# Patient Record
Sex: FEMALE | Race: BLACK OR AFRICAN AMERICAN | URBAN - METROPOLITAN AREA
[De-identification: names, ages, dates, MRNs, and addresses within clinical notes are randomized per-mention and may not be internally consistent; named-entity substitution may affect disease eponyms.]

---

## 2017-02-17 ENCOUNTER — OFFICE VISIT (OUTPATIENT)
Dept: NEUROLOGY | Age: 47
End: 2017-02-17

## 2017-02-17 VITALS
OXYGEN SATURATION: 96 % | DIASTOLIC BLOOD PRESSURE: 90 MMHG | HEIGHT: 67 IN | WEIGHT: 234 LBS | HEART RATE: 67 BPM | SYSTOLIC BLOOD PRESSURE: 132 MMHG | RESPIRATION RATE: 16 BRPM | BODY MASS INDEX: 36.73 KG/M2

## 2017-02-17 DIAGNOSIS — G56.02 MILD CARPAL TUNNEL SYNDROME OF LEFT WRIST: ICD-10-CM

## 2017-02-17 DIAGNOSIS — G43.709 CHRONIC MIGRAINE W/O AURA W/O STATUS MIGRAINOSUS, NOT INTRACTABLE: ICD-10-CM

## 2017-02-17 DIAGNOSIS — M54.16 LUMBAR BACK PAIN WITH RADICULOPATHY AFFECTING LEFT LOWER EXTREMITY: Primary | ICD-10-CM

## 2017-02-17 DIAGNOSIS — M47.22 CERVICAL RADICULOPATHY DUE TO DEGENERATIVE JOINT DISEASE OF SPINE: ICD-10-CM

## 2017-02-17 DIAGNOSIS — M54.16 LUMBAR BACK PAIN WITH RADICULOPATHY AFFECTING RIGHT LOWER EXTREMITY: ICD-10-CM

## 2017-02-17 RX ORDER — GABAPENTIN 300 MG/1
300 CAPSULE ORAL 3 TIMES DAILY
Qty: 100 CAP | Refills: 11 | Status: SHIPPED | OUTPATIENT
Start: 2017-02-17

## 2017-02-17 RX ORDER — NORTRIPTYLINE HYDROCHLORIDE 25 MG/1
CAPSULE ORAL
COMMUNITY

## 2017-02-17 RX ORDER — MELOXICAM 15 MG/1
15 TABLET ORAL DAILY
COMMUNITY
End: 2017-02-17 | Stop reason: SDUPTHER

## 2017-02-17 RX ORDER — GABAPENTIN 300 MG/1
300 CAPSULE ORAL
COMMUNITY
End: 2017-02-17 | Stop reason: SDUPTHER

## 2017-02-17 RX ORDER — CETIRIZINE HCL 10 MG
TABLET ORAL
COMMUNITY

## 2017-02-17 RX ORDER — MELOXICAM 15 MG/1
15 TABLET ORAL DAILY
Qty: 30 TAB | Refills: 11 | Status: SHIPPED | OUTPATIENT
Start: 2017-02-17

## 2017-02-17 RX ORDER — METOPROLOL TARTRATE 25 MG/1
TABLET, FILM COATED ORAL 2 TIMES DAILY
COMMUNITY

## 2017-02-17 RX ORDER — BUPROPION HYDROCHLORIDE 150 MG/1
TABLET, EXTENDED RELEASE ORAL 2 TIMES DAILY
COMMUNITY
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
## 2017-02-17 NOTE — PROCEDURES
[facility] Neurology Clinic at [address] [facility] [address] [facility], [address] [facility]  Tel [phone]     Fax [phone]  Electrodiagnostic Study Report  Test Date:  2017    Patient: Radha Beck : 1970 Physician: Leann Peterson MD   Sex: Female  < Ref Phys: Randa Leoker     Clinical Indication: Patient is a 81-year-old female with a history of severe back pain radiating to both legs, and neck pain radiating to left arm, with a history of multiple trauma to rule out motor radiculopathy, lumbar stenosis, or other entrapment neuropathies or polyneuropathies. Impression: This study shows electrophysiologic evidence of an essentially normal EMG and nerve conduction velocity study of both legs, showing no clear evidence of motor radiculopathy or other neuromuscular disease, and a borderline minimal carpal tunnel syndrome in the left upper extremity as manifested by mild prolongation of the distal sensory latency of the median nerve only. Electro study was normal and the left upper extremity and the distal motor latency of the median nerve was also normal.    Correlation with imaging modalities may be of further diagnostic benefit if clinically indicated, and repeat studies in 6-12 months time may also be of further diagnostic benefit if clinically indicated. EMG & NCV Findings:  Evaluation of the left median sensory nerve showed prolonged distal peak latency (4.3 ms) and decreased conduction velocity (Wrist-2nd Digit, 33 m/s). The right sural sensory nerve showed prolonged distal peak latency (4.1 ms) and decreased conduction velocity (Calf-Lat Mall, 34 m/s). All remaining nerves (as indicated in the following tables) were within normal limits. Left vs. Right side comparison data for the tibial motor nerve indicates abnormal L-R velocity difference (Knee-Ankle, 11 m/s). All remaining left vs. right side differences were within normal limits. Nerve Conduction Studies  Anti Sensory Summary Table     Stim Site NR Peak (ms) P-T Amp (µV) Site1 Site2 Delta-P (ms) Dist (cm) Rene (m/s)   Left Median Anti Sensory (2nd Digit)  24.9°C   Wrist    4.3 37.1 Wrist 2nd Digit 4.3 14.0 33   Left Radial Anti Sensory (Base 1st Digit)  25°C   Wrist    2.6 34.7 Wrist Base 1st Digit 2.6 0.0    Right Sup Fibular Anti Sensory (Ant Lat Mall)  25°C   14 cm    3.8 18.2 14 cm Ant Lat Mall 3.8 14.0 37   Site 2    3.7 24.1        Left Sural Anti Sensory (Lat Mall)  25°C   Calf    4.0 8.7 Calf Lat Mall 4.0 14.0 35   Site 2    3.8 19.4        Right Sural Anti Sensory (Lat Mall)  24.9°C   Calf    4.1 17.4 Calf Lat Mall 4.1 14.0 34   Site 2    4.4 12.7        Left Ulnar Anti Sensory (5th Digit)  24.9°C   Wrist    3.6 39.3 Wrist 5th Digit 3.6 14.0 39     Motor Summary Table     Stim Site NR Onset (ms) O-P Amp (mV) Site1 Site2 Delta-0 (ms) Dist (cm) Rene (m/s)   Left Fibular Motor (Ext Dig Brev)  24.9°C   Ankle    3.4 4.6 B Fib Ankle 7.1 37.0 52   B Fib    10.5 4.4 Poplt B Fib 2.1 11.0 52   Poplt    12.6 4.4        Right Fibular Motor (Ext Dig Brev)  24.9°C   Ankle    3.7 6.8 B Fib Ankle 7.6 38.0 50   B Fib    11.3 5.9 Poplt B Fib 1.9 12.0 63   Poplt    13.2 5.9        Left Median Motor (Abd Poll Brev)  24.9°C   Wrist    3.4 13.0 Elbow Wrist 3.9 26.0 79T   Elbow    7.3 10.3        Left Tibial Motor (Abd  Brev)  25°C   Ankle    5.2 8.7 Knee Ankle 7.0 37.0 53   Knee    12.2 7.2        Right Tibial Motor (Abd  Brev)  24.9°C   Ankle    4.8 11.7 Knee Ankle 8.4 35.0 42   Knee    13.2 9.0        Left Ulnar Motor (Abd Dig Minimi)  24.9°C   Wrist    2.1 8.3 B Elbow Wrist 2.0 23.0 115   B Elbow    4.1 7.5 A Elbow B Elbow 1.9 11.0 58   A Elbow    6.0 7.6          Comparison Summary Table     Stim Site NR Peak (ms) P-T Amp (µV) Site1 Site2 Delta-P (ms)   Left Median/Ulnar Palm Comparison (Wrist - 8cm)  25°C   Median Palm    1.9 13.4 Median Palm Ulnar Palm 0.0   Ulnar Palm    1.9 5.0 EMG     Side Muscle Nerve Root Ins Act Fibs Psw Amp Dur Poly Recrt Comment   Right AntTibialis Dp Br Fibular L4-5 Nml Nml Nml Nml Nml 0 Nml    Right Gastroc Tibial S1-2 Nml Nml Nml Nml Nml 0 Nml    Right VastusLat Femoral L2-4 Nml Nml Nml Nml Nml 0 Nml    Right BicepsFemS Sciatic L5-S1 Nml Nml Nml Nml Nml 0 Nml    Right Fibularis Long Sup Br Fibular L5-S1 Nml Nml Nml Nml Nml 0 Nml    Right Lumbo Parasp Low Rami L5-S1 Nml Nml Nml        Left Abd Poll Brev Median C8-T1 Nml Nml Nml Nml Nml 0 Nml    Left 1stDorInt Ulnar C8-T1 Nml Nml Nml Nml Nml 0 Nml    Left Biceps Musculocut C5-6 Nml Nml Nml Nml Nml 0 Nml    Left Triceps Radial C6-7-8 Nml Nml Nml Nml Nml 0 Nml    Left Deltoid Axillary C5-6 Nml Nml Nml Nml Nml 0 Nml    Left Cervical Parasp Mid Rami C4-6 Nml Nml Nml        Left AntTibialis Dp Br Fibular L4-5 Nml Nml Nml Nml Nml 0 Nml    Left Gastroc Tibial S1-2 Nml Nml Nml Nml Nml 0 Nml    Left VastusLat Femoral L2-4 Nml Nml Nml Nml Nml 0 Nml    Left BicepsFemS Sciatic L5-S1 Nml Nml Nml Nml Nml 0 Nml    Left Fibularis Long Sup Br Fibular L5-S1 Nml Nml Nml Nml Nml 0 Nml    Left Lumbo Parasp Low Rami L5-S1 Nml Nml Nml            Waveforms:                                       __________________  Eze Neves M.D.

## 2017-02-17 NOTE — MR AVS SNAPSHOT
Visit Information Date & Time Provider Department Dept. Phone Encounter #  
 2/17/2017 11:00 AM Sohan Hollins MD Neurology Clinic at [facility] [phone] [id number] Follow-up Instructions Return in about 6 months (around 8/17/2017). Allergies as of 2/17/2017  Review Complete On: 2/17/2017 By: Sohan Hollins MD  
  
 Severity Noted Reaction Type Reactions Other Plant, Animal, Environmental  02/17/2017    Other (comments) Bee Stings Current Immunizations  Never Reviewed No immunizations on file. Not reviewed this visit You Were Diagnosed With   
  
 Codes Comments Lumbar back pain with radiculopathy affecting left lower extremity    -  Primary ICD-10-CM: M54.17 ICD-9-CM: 724.4 Lumbar back pain with radiculopathy affecting right lower extremity     ICD-10-CM: M54.17 ICD-9-CM: 724.4 Cervical radiculopathy due to degenerative joint disease of spine     ICD-10-CM: M47.22 
ICD-9-CM: 721.0 Chronic migraine w/o aura w/o status migrainosus, not intractable     ICD-10-CM: H21.011 ICD-9-CM: 346.70 Vitals BP Pulse Resp Height(growth percentile) Weight(growth percentile) SpO2  
 132/90 67 16 5' 7\" (1.702 m) 234 lb (106.1 kg) 96% BMI Smoking Status 36.65 kg/m2 Never Smoker Vitals History BMI and BSA Data Body Mass Index Body Surface Area  
 36.65 kg/m 2 2.24 m 2 Preferred Pharmacy Pharmacy Name Phone [facility] [address] [phone] Your Updated Medication List  
  
   
This list is accurate as of: 2/17/17 12:01 PM.  Always use your most recent med list.  
  
  
  
  
 buPROPion  mg SR tablet Commonly known as:  Shyla Cera Take  by mouth two (2) times a day. cetirizine 10 mg tablet Commonly known as:  ZYRTEC Take  by mouth.  
  
 gabapentin 300 mg capsule Commonly known as:  NEURONTIN  
 Take 1 Cap by mouth three (3) times daily. meloxicam 15 mg tablet Commonly known as:  MOBIC Take 1 Tab by mouth daily. metoprolol tartrate 25 mg tablet Commonly known as:  LOPRESSOR Take  by mouth two (2) times a day. nortriptyline 25 mg capsule Commonly known as:  PAMELOR Take  by mouth nightly. Prescriptions Sent to Pharmacy Refills  
 gabapentin (NEURONTIN) 300 mg capsule 11 Sig: Take 1 Cap by mouth three (3) times daily. Class: Normal  
 Pharmacy: [facility]/pharmacy [address] [place] [address] Ph #: [phone] Route: Oral  
 meloxicam (MOBIC) 15 mg tablet 11 Sig: Take 1 Tab by mouth daily. Class: Normal  
 Pharmacy: [facility]/pharmacy [address] [place] [address] Ph #: [phone] Route: Oral  
  
Follow-up Instructions Return in about 6 months (around 8/17/2017). To-Do List   
 02/17/2017 Neurology:  EMG LIMITED   
  
 02/17/2017 Neurology:  EMG NCV MOTOR WO F/WAVE PER NERVE   
  
 02/17/2017 Neurology:  EMG NCV SENSORY PER NERVE   
  
 02/17/2017 Imaging:  XR SPINE CERV W OBL/FLEX/EXT MIN 6 V COMP   
  
 02/17/2017 Imaging:  XR SPINE LUMB MIN 4 V   
  
 02/17/2017 Imaging:  XR SPINE THORAC 3 V Patient Instructions A Healthy Lifestyle: Care Instructions Your Care Instructions A healthy lifestyle can help you feel good, stay at a healthy weight, and have plenty of energy for both work and play. A healthy lifestyle is something you can share with your whole family. A healthy lifestyle also can lower your risk for serious health problems, such as high blood pressure, heart disease, and diabetes. You can follow a few steps listed below to improve your health and the health of your family. Follow-up care is a key part of your treatment and safety.  Be sure to make and go to all appointments, and call your doctor if you are having problems. Its also a good idea to know your test results and keep a list of the medicines you take. How can you care for yourself at home? · Do not eat too much sugar, fat, or fast foods. You can still have dessert and treats now and then. The goal is moderation. · Start small to improve your eating habits. Pay attention to portion sizes, drink less juice and soda pop, and eat more fruits and vegetables. ¨ Eat a healthy amount of food. A 3-ounce serving of meat, for example, is about the size of a deck of cards. Fill the rest of your plate with vegetables and whole grains. ¨ Limit the amount of soda and sports drinks you have every day. Drink more water when you are thirsty. ¨ Eat at least 5 servings of fruits and vegetables every day. It may seem like a lot, but it is not hard to reach this goal. A serving or helping is 1 piece of fruit, 1 cup of vegetables, or 2 cups of leafy, raw vegetables. Have an apple or some carrot sticks as an afternoon snack instead of a candy bar. Try to have fruits and/or vegetables at every meal. 
· Make exercise part of your daily routine. You may want to start with simple activities, such as walking, bicycling, or slow swimming. Try to be active 30 to 60 minutes every day. You do not need to do all 30 to 60 minutes all at once. For example, you can exercise 3 times a day for 10 or 20 minutes. Moderate exercise is safe for most people, but it is always a good idea to talk to your doctor before starting an exercise program. 
· Keep moving. Emily Mendozad the lawn, work in the garden, or KBLE. Take the stairs instead of the elevator at work. · If you smoke, quit. People who smoke have an increased risk for heart attack, stroke, cancer, and other lung illnesses. Quitting is hard, but there are ways to boost your chance of quitting tobacco for good. ¨ Use nicotine gum, patches, or lozenges. ¨ Ask your doctor about stop-smoking programs and medicines. ¨ Keep trying. In addition to reducing your risk of diseases in the future, you will notice some benefits soon after you stop using tobacco. If you have shortness of breath or asthma symptoms, they will likely get better within a few weeks after you quit. · Limit how much alcohol you drink. Moderate amounts of alcohol (up to 2 drinks a day for men, 1 drink a day for women) are okay. But drinking too much can lead to liver problems, high blood pressure, and other health problems. Family health If you have a family, there are many things you can do together to improve your health. · Eat meals together as a family as often as possible. · Eat healthy foods. This includes fruits, vegetables, lean meats and dairy, and whole grains. · Include your family in your fitness plan. Most people think of activities such as jogging or tennis as the way to fitness, but there are many ways you and your family can be more active. Anything that makes you breathe hard and gets your heart pumping is exercise. Here are some tips: 
¨ Walk to do errands or to take your child to school or the bus. ¨ Go for a family bike ride after dinner instead of watching TV. Where can you learn more? Go to http://robertaSlickLoginjanet.info/. Enter H947 in the search box to learn more about \"A Healthy Lifestyle: Care Instructions. \" Current as of: July 26, 2016 Content Version: 11.1 © 8669-4569 [employer]. Care instructions adapted under license by [employer] (which disclaims liability or warranty for this information). If you have questions about a medical condition or this instruction, always ask your healthcare professional. [place] [address] any warranty or liability for your use of this information. Introducing [facility]! Asia Chaney introduces 360Learning patient portal. Now you can access parts of your medical record, email your doctor's office, and request medication refills online. 1. In your internet browser, go to [email or web address] 2. Click on the First Time User? Click Here link in the Sign In box. You will see the New Member Sign Up page. 3. Enter your Printed Piece Access Code exactly as it appears below. You will not need to use this code after youve completed the sign-up process. If you do not sign up before the expiration date, you must request a new code. · Printed Piece Access Code: [email or web address] Expires: 5/18/2017 10:51 AM 
 
4. Enter the last four digits of your Social Security Number (xxxx) and Date of Birth (mm/dd/yyyy) as indicated and click Submit. You will be taken to the next sign-up page. 5. Create a tinycluest ID. This will be your Printed Piece login ID and cannot be changed, so think of one that is secure and easy to remember. 6. Create a Printed Piece password. You can change your password at any time. 7. Enter your Password Reset Question and Answer. This can be used at a later time if you forget your password. 8. Enter your e-mail address. You will receive e-mail notification when new information is available in [address] 9. Click Sign Up. You can now view and download portions of your medical record. 10. Click the Download Summary menu link to download a portable copy of your medical information. If you have questions, please visit the Frequently Asked Questions section of the Printed Piece website. Remember, Printed Piece is NOT to be used for urgent needs. For medical emergencies, dial 911. Now available from your iPhone and Android! Please provide this summary of care documentation to your next provider. Your primary care clinician is listed as Rex Ricks. If you have any questions after today's visit, please call [phone].

## 2017-02-17 NOTE — PATIENT INSTRUCTIONS
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
## 2022-03-18 PROBLEM — M47.22 CERVICAL RADICULOPATHY DUE TO DEGENERATIVE JOINT DISEASE OF SPINE: Status: ACTIVE | Noted: 2017-02-17

## 2022-03-18 PROBLEM — M54.16 LUMBAR BACK PAIN WITH RADICULOPATHY AFFECTING LEFT LOWER EXTREMITY: Status: ACTIVE | Noted: 2017-02-17

## 2022-03-18 PROBLEM — G43.709 CHRONIC MIGRAINE W/O AURA W/O STATUS MIGRAINOSUS, NOT INTRACTABLE: Status: ACTIVE | Noted: 2017-02-17

## 2022-03-18 PROBLEM — G56.02 MILD CARPAL TUNNEL SYNDROME OF LEFT WRIST: Status: ACTIVE | Noted: 2017-02-17

## 2022-03-19 PROBLEM — M54.16 LUMBAR BACK PAIN WITH RADICULOPATHY AFFECTING RIGHT LOWER EXTREMITY: Status: ACTIVE | Noted: 2017-02-17

## 2023-05-01 ENCOUNTER — HOSPITAL ENCOUNTER (EMERGENCY)
Age: 53
Discharge: HOME | End: 2023-05-01
Attending: EMERGENCY MEDICINE
Payer: MEDICAID

## 2023-05-01 VITALS
SYSTOLIC BLOOD PRESSURE: 173 MMHG | OXYGEN SATURATION: 100 % | RESPIRATION RATE: 16 BRPM | DIASTOLIC BLOOD PRESSURE: 100 MMHG | BODY MASS INDEX: 30.84 KG/M2 | HEART RATE: 68 BPM | HEIGHT: 67 IN | TEMPERATURE: 97.8 F | WEIGHT: 196.5 LBS

## 2023-05-01 DIAGNOSIS — I10 PRIMARY HYPERTENSION: Primary | ICD-10-CM

## 2023-05-01 DIAGNOSIS — G44.209 TENSION HEADACHE: ICD-10-CM

## 2023-05-01 PROCEDURE — 99284 EMERGENCY DEPT VISIT MOD MDM: CPT

## 2023-05-01 PROCEDURE — 74011250637 HC RX REV CODE- 250/637: Performed by: EMERGENCY MEDICINE

## 2023-05-01 RX ORDER — LORATADINE 10 MG/1
10 TABLET ORAL DAILY
Qty: 20 TABLET | Refills: 0 | Status: SHIPPED | OUTPATIENT
Start: 2023-05-01 | End: 2023-05-21

## 2023-05-01 RX ORDER — LISINOPRIL 10 MG/1
10 TABLET ORAL DAILY
Qty: 10 TABLET | Refills: 0 | Status: SHIPPED | OUTPATIENT
Start: 2023-05-01 | End: 2023-05-11

## 2023-05-01 RX ORDER — AMLODIPINE BESYLATE 5 MG/1
10 TABLET ORAL
Status: COMPLETED | OUTPATIENT
Start: 2023-05-01 | End: 2023-05-01

## 2023-05-01 RX ORDER — LISINOPRIL 5 MG/1
10 TABLET ORAL
Status: COMPLETED | OUTPATIENT
Start: 2023-05-01 | End: 2023-05-01

## 2023-05-01 RX ORDER — FLUTICASONE PROPIONATE 50 MCG
2 SPRAY, SUSPENSION (ML) NASAL DAILY
Qty: 1 EACH | Refills: 0 | Status: SHIPPED | OUTPATIENT
Start: 2023-05-01

## 2023-05-01 RX ORDER — AMLODIPINE BESYLATE 10 MG/1
10 TABLET ORAL DAILY
Qty: 30 TABLET | Refills: 0 | Status: SHIPPED | OUTPATIENT
Start: 2023-05-01 | End: 2023-05-31

## 2023-05-01 RX ADMIN — LISINOPRIL 10 MG: 5 TABLET ORAL at 09:03

## 2023-05-01 RX ADMIN — AMLODIPINE BESYLATE 10 MG: 5 TABLET ORAL at 09:03

## 2023-05-01 NOTE — ED TRIAGE NOTES
Pt had headaches x 10 days, no BP meds, was supposed to see PCP today for symptoms, but wanted to come to ED instead

## 2023-05-01 NOTE — ED NOTES
Pt arrives to the ED AAOX4 with a c/c of right sided HA, and right sided neck pain radiating to the RUE onset 1 week ago. Pt denies any dizziness or visual disturbances. Pt states she is in a recovery house, and last used Cocaine 10 days ago. Pt states she stopped taking her prescribed BP meds because she self medicates with drugs. Pt states she has a hx of TIA, pt is hypertensive. Pt is able to speak in full sentences and able to ambulate with steady gait. Pt is noted in stable condition, now in ED room with bed side rail up, bed to lowest position, and call light within reach. Will continue to monitor and wait for ED provider evaluation. Emergency Department Nursing Plan of Care       The Nursing Plan of Care is developed from the Nursing assessment and Emergency Department Attending provider initial evaluation. The plan of care may be reviewed in the ED Provider note.     The Plan of Care was developed with the following considerations:   Patient / Family readiness to learn indicated by:verbalized understanding  Persons(s) to be included in education: patient  Barriers to Learning/Limitations:No    Signed     Alexandria Randle    5/1/2023   8:31 AM

## 2023-05-01 NOTE — ED PROVIDER NOTES
Detail Level: Generalized EMERGENCY DEPARTMENT HISTORY AND PHYSICAL EXAM      Patient Name: Teodora Chavira  MRN: [id number]  YOB: 1970    Provider: Karis Bond MD  PCP: Jean Bedolla MD     Time/Date of evaluation: 8:49 AM 23    History of Presenting Illness     Chief Complaint   Patient presents with    Headache     History Provided By: Patient     History Alber Allen):   Teodora Chavira is a 47 y.o. female with a PMHx of hypertension and migraine headaches  who presents to the emergency department  by EMS C/O right-sided headaches for the past 10 days that radiates to the right posterior neck and right upper extremity. Patient denies any lightheadedness or dizziness but does endorse sinus congestion. She tells me she is in a recovery house for drug abuse and last used cocaine 10 days ago. She denies taking any medications for the symptoms.     Past History     Past Medical History:  Past Medical History:   Diagnosis Date    Essential hypertension     Migraine        Past Surgical History:  Past Surgical History:   Procedure Laterality Date    HX BREAST BIOPSY      left    HX  SECTION         Family History:  Family History   Problem Relation Age of Onset    Cancer Mother         UNKNOWN    Dementia Mother     Hypertension Mother     Diabetes Mother     Migraines Father     Stroke Father     Cancer Father         PITUITARY TUMOR    Other Child         spina bifida and ring 6 chromosomal abn    Schizophrenia Child        Social History:  Social History     Tobacco Use    Smoking status: Never   Substance Use Topics    Alcohol use: Yes       Medications:  Current Facility-Administered Medications   Medication Dose Route Frequency Provider Last Rate Last Admin    amLODIPine (NORVASC) tablet 10 mg  10 mg Oral NOW Pattie Diaz MD        lisinopriL (PRINIVIL, ZESTRIL) tablet 10 mg  10 mg Oral NOW Pattie Diaz MD         Current Outpatient Medications   Medication Sig Dispense Refill amLODIPine (Norvasc) 10 mg tablet Take 1 Tablet by mouth daily for 30 days. 30 Tablet 0    lisinopriL (PriniviL) 10 mg tablet Take 1 Tablet by mouth daily for 10 days. 10 Tablet 0    fluticasone propionate (FLONASE) 50 mcg/actuation nasal spray 2 Sprays by Both Nostrils route daily. 1 Each 0    loratadine (CLARITIN) 10 mg tablet Take 1 Tablet by mouth daily for 20 days. 20 Tablet 0    nortriptyline (PAMELOR) 25 mg capsule Take  by mouth nightly. buPROPion SR (WELLBUTRIN SR) 150 mg SR tablet Take  by mouth two (2) times a day. cetirizine (ZYRTEC) 10 mg tablet Take  by mouth.      gabapentin (NEURONTIN) 300 mg capsule Take 1 Cap by mouth three (3) times daily. 100 Cap 11    meloxicam (MOBIC) 15 mg tablet Take 1 Tab by mouth daily. 30 Tab 11       Allergies: Allergies   Allergen Reactions    Other Plant, Animal, Environmental Other (comments)     Bee Stings       Social Determinants of Health:  Social Determinants of Health     Tobacco Use: Unknown    Smoking Tobacco Use: Never    Smokeless Tobacco Use: Unknown    Passive Exposure: Not on file   Alcohol Use: Not on file   Financial Resource Strain: Not on file   Food Insecurity: Not on file   Transportation Needs: Not on file   Physical Activity: Not on file   Stress: Not on file   Social Connections: Not on file   Intimate Partner Violence: Not on file   Depression: Not on file   Housing Stability: Not on file       Review of Systems     Review of Systems   Constitutional:  Negative for chills and fever. HENT:  Positive for sinus pressure. Respiratory:  Negative for cough. Neurological:  Positive for headaches. All other systems reviewed and are negative. Physical Exam     Patient Vitals for the past 24 hrs:   Temp Pulse Resp BP SpO2   05/01/23 0751 97.8 °F (36.6 °C) 64 16 (!) 165/118 100 %       Physical Exam  Vitals and nursing note reviewed. Constitutional:       Appearance: Normal appearance. She is well-developed.    HENT:      Head: Normocephalic. Eyes:      Conjunctiva/sclera: Conjunctivae normal.   Cardiovascular:      Rate and Rhythm: Normal rate and regular rhythm. Pulses: Normal pulses. Heart sounds: Normal heart sounds, S1 normal and S2 normal.   Pulmonary:      Effort: Pulmonary effort is normal. No respiratory distress. Breath sounds: Normal breath sounds. No wheezing. Abdominal:      General: Bowel sounds are normal. There is no distension. Palpations: Abdomen is soft. Tenderness: There is no abdominal tenderness. There is no rebound. Musculoskeletal:         General: Normal range of motion. Cervical back: Full passive range of motion without pain, normal range of motion and neck supple. Skin:     General: Skin is warm and dry. Findings: No rash. Neurological:      Mental Status: She is alert and oriented to person, place, and time. Psychiatric:         Speech: Speech normal.         Behavior: Behavior normal.         Thought Content: Thought content normal.         Judgment: Judgment normal.       ED Course     8:49 AM I Gloria Bowers MD) am the first provider for this patient. Initial assessment performed. I reviewed the vital signs, available nursing notes, past medical history, past surgical history, family history and social history. The patients presenting problems have been discussed, and they are in agreement with the care plan formulated and outlined with them. I have encouraged them to ask questions as they arise throughout their visit.     Records Reviewed: Nursing Notes and Old Medical Records    Pulse Oximetry Analysis - 100% on RA    MEDICATIONS ADMINISTERED IN THE ED:  Medications   amLODIPine (NORVASC) tablet 10 mg (10 mg Oral Given 5/1/23 0903)   lisinopriL (PRINIVIL, ZESTRIL) tablet 10 mg (10 mg Oral Given 5/1/23 0903)     Medical Decision Making     DDX: Tension headache, migraine headache, hypertensive urgency, posttraumatic headache    DISCUSSION:  This appears to be a moderate condition. This appears to be an acute condition. 47 y.o. female presents with a right-sided headache that radiates to her right neck and upper extremity for the past 7 to 10 days. I will restart her on her home blood pressure medications (lisinopril and Norvasc) and have her follow-up with outpatient primary care and neurology as needed. Patient tells me she has not been taking her medications because she has been in this substance abuse program and has not seen her doctor for a while. Patient tells me she has an appointment with a provider from the [facility] later today for blood pressure evaluation. The patient agrees with the plan of discharge. Additional Considerations:  None    Diagnosis and Disposition     DISCHARGE NOTE:  Shakeel Dumas's results have been reviewed with her. She has been counseled regarding her diagnosis, treatment, and plan. She verbally conveys understanding and agreement of the signs, symptoms, diagnosis, treatment and prognosis and additionally agrees to follow up as discussed. She also agrees with the care-plan and conveys that all of her questions have been answered. I have also provided discharge instructions for her that include: educational information regarding their diagnosis and treatment, and list of reasons why they would want to return to the ED prior to their follow-up appointment, should her condition change. She has been provided with education for proper emergency department utilization. CLINICAL IMPRESSION:    1. Primary hypertension    2. Tension headache        PLAN:  1. D/C Home  2. Current Discharge Medication List        START taking these medications    Details   amLODIPine (Norvasc) 10 mg tablet Take 1 Tablet by mouth daily for 30 days. Qty: 30 Tablet, Refills: 0  Start date: 5/1/2023, End date: 5/31/2023      lisinopriL (PriniviL) 10 mg tablet Take 1 Tablet by mouth daily for 10 days.   Qty: 10 Tablet, Refills: 0  Start Detail Level: Detailed date: 5/1/2023, End date: 5/11/2023      fluticasone propionate (FLONASE) 50 mcg/actuation nasal spray 2 Sprays by Both Nostrils route daily. Qty: 1 Each, Refills: 0  Start date: 5/1/2023      loratadine (CLARITIN) 10 mg tablet Take 1 Tablet by mouth daily for 20 days. Qty: 20 Tablet, Refills: 0  Start date: 5/1/2023, End date: 5/21/2023           3. Follow-up Information       Follow up With Specialties Details Why [id number]  Today  [address] [id number] [phone]    [facility] EMERGENCY DEPT Emergency Medicine  As needed, If symptoms worsen [address] MD am the primary clinician of record. Dragon Disclaimer     Please note that this dictation was completed with [employer], the computer voice recognition software. Quite often unanticipated grammatical, syntax, homophones, and other interpretive errors are inadvertently transcribed by the computer software. Please disregard these errors. Please excuse any errors that have escaped final proofreading.     Kenzie Walker MD

## 2023-05-23 RX ORDER — AMLODIPINE BESYLATE 10 MG/1
10 TABLET ORAL DAILY
Qty: 30 TABLET | Refills: 0 | COMMUNITY
Start: 2023-05-01 | End: 2023-05-31

## 2023-05-23 RX ORDER — BUPROPION HYDROCHLORIDE 150 MG/1
TABLET, EXTENDED RELEASE ORAL 2 TIMES DAILY
COMMUNITY

## 2023-05-23 RX ORDER — CETIRIZINE HYDROCHLORIDE 10 MG/1
TABLET ORAL
COMMUNITY

## 2023-05-23 RX ORDER — MELOXICAM 15 MG/1
15 TABLET ORAL DAILY
COMMUNITY
Start: 2017-02-17

## 2023-05-23 RX ORDER — GABAPENTIN 300 MG/1
300 CAPSULE ORAL 3 TIMES DAILY
COMMUNITY
Start: 2017-02-17

## 2023-05-23 RX ORDER — NORTRIPTYLINE HYDROCHLORIDE 25 MG/1
CAPSULE ORAL
COMMUNITY

## 2023-05-23 RX ORDER — FLUTICASONE PROPIONATE 50 MCG
2 SPRAY, SUSPENSION (ML) NASAL DAILY
COMMUNITY
Start: 2023-05-01